# Patient Record
Sex: FEMALE | Race: AMERICAN INDIAN OR ALASKA NATIVE
[De-identification: names, ages, dates, MRNs, and addresses within clinical notes are randomized per-mention and may not be internally consistent; named-entity substitution may affect disease eponyms.]

---

## 2019-01-15 ENCOUNTER — HOSPITAL ENCOUNTER (OUTPATIENT)
Dept: HOSPITAL 5 - SPVWC | Age: 64
Discharge: HOME | End: 2019-01-15
Attending: FAMILY MEDICINE
Payer: COMMERCIAL

## 2019-01-15 DIAGNOSIS — Z12.31: Primary | ICD-10-CM

## 2019-01-15 PROCEDURE — 77067 SCR MAMMO BI INCL CAD: CPT

## 2019-01-16 NOTE — MAMMOGRAPHY REPORT
BILATERAL DIGITAL SCREENING MAMMOGRAM with CAD: 01/15/19 15:16:00



CLINICAL: Routine screening.



COMPARISON:08/18/16



FINDINGS: The breasts are almost entirely fatty. No mass, architectural 

distortion or suspicious calcifications.



IMPRESSION: No mammographic evidence of malignancy.



BI-RADS CATEGORY: 1 - - Negative



RECOMMENDATION: Routine mammographic screening in one year.





COMMENT:

Patient follow-up letters are generated by our [facility] application.